# Patient Record
Sex: FEMALE | Race: WHITE | NOT HISPANIC OR LATINO | Employment: UNEMPLOYED | URBAN - METROPOLITAN AREA
[De-identification: names, ages, dates, MRNs, and addresses within clinical notes are randomized per-mention and may not be internally consistent; named-entity substitution may affect disease eponyms.]

---

## 2017-05-10 ENCOUNTER — HOSPITAL ENCOUNTER (OUTPATIENT)
Dept: RADIOLOGY | Facility: MEDICAL CENTER | Age: 42
End: 2017-05-10
Attending: OBSTETRICS & GYNECOLOGY
Payer: COMMERCIAL

## 2017-05-10 DIAGNOSIS — Z13.9 SCREENING: ICD-10-CM

## 2017-05-10 PROCEDURE — 77063 BREAST TOMOSYNTHESIS BI: CPT

## 2017-05-17 ENCOUNTER — OFFICE VISIT (OUTPATIENT)
Dept: MEDICAL GROUP | Facility: MEDICAL CENTER | Age: 42
End: 2017-05-17
Payer: COMMERCIAL

## 2017-05-17 VITALS
BODY MASS INDEX: 21.5 KG/M2 | SYSTOLIC BLOOD PRESSURE: 112 MMHG | OXYGEN SATURATION: 95 % | TEMPERATURE: 99.7 F | DIASTOLIC BLOOD PRESSURE: 68 MMHG | HEIGHT: 62 IN | HEART RATE: 71 BPM | WEIGHT: 116.84 LBS

## 2017-05-17 DIAGNOSIS — Z00.00 HEALTH CARE MAINTENANCE: ICD-10-CM

## 2017-05-17 DIAGNOSIS — H92.02 EARACHE, LEFT: ICD-10-CM

## 2017-05-17 DIAGNOSIS — H65.01 RIGHT ACUTE SEROUS OTITIS MEDIA, RECURRENCE NOT SPECIFIED: ICD-10-CM

## 2017-05-17 PROCEDURE — 99214 OFFICE O/P EST MOD 30 MIN: CPT | Performed by: INTERNAL MEDICINE

## 2017-05-17 RX ORDER — AZITHROMYCIN 250 MG/1
TABLET, FILM COATED ORAL
Qty: 6 TAB | Refills: 0 | Status: SHIPPED | OUTPATIENT
Start: 2017-05-17 | End: 2017-06-29

## 2017-05-17 ASSESSMENT — PATIENT HEALTH QUESTIONNAIRE - PHQ9: CLINICAL INTERPRETATION OF PHQ2 SCORE: 0

## 2017-05-17 NOTE — MR AVS SNAPSHOT
"        Ayse Reyes   2017 1:40 PM   Office Visit   MRN: [id number]    Department:  [place] [address]   Dept Phone:  [phone]    Description:  Female : 1975   Provider:  Gregorio Powers M.D.           Reason for Visit     Other Ear ache       Allergies as of 2017     Allergen Noted Reactions    Peanuts [Peanut Oil] 2016       Mouth gets itchy      You were diagnosed with     Earache, left   [181865]       Right acute serous otitis media, recurrence not specified   [2707488]       Health care maintenance   [[id number]]         Vital Signs     Blood Pressure Pulse Temperature Height Weight Body Mass Index    112/68 mmHg 71 37.6 °C (99.7 °F) 1.575 m (5' 2\") 53 kg (116 lb 13.5 oz) 21.37 kg/m2    Oxygen Saturation Smoking Status                95% Never Smoker           Basic Information     Date Of Birth Sex Race Ethnicity Preferred Language    1975 Female White Non- English      Your appointments     2017  2:00 PM   Access New To You with Bobbi Moyer M.D.   [facility] (--)    [address]  [address]  [facility] [id number] [phone]              Problem List              ICD-10-CM Priority Class Noted - Resolved    Health care maintenance Z00.00   2016 - Present      Health Maintenance        Date Due Completion Dates    PAP SMEAR 3/19/1997 ---    IMM DTaP/Tdap/Td Vaccine (1 - Tdap) 2002    MAMMOGRAM 5/10/2018 5/10/2017, 5/10/2017 (Done), 2016    Override on 5/10/2017: Done            Current Immunizations     Hepatitis B Vaccine Recombivax (Adol/Adult) 2001    MMR Vaccine 1992, 1977    OPV - Historical Data 1982, 1978, 1975, 1975, 1975    TD Vaccine 2002      Below and/or attached are the medications your provider expects you to take. Review all of your home medications and newly ordered medications with your provider and/or pharmacist. Follow medication instructions as " directed by your provider and/or pharmacist. Please keep your medication list with you and share with your provider. Update the information when medications are discontinued, doses are changed, or new medications (including over-the-counter products) are added; and carry medication information at all times in the event of emergency situations     Allergies:  PEANUTS - (reactions not documented)               Medications  Valid as of: May 17, 2017 -  2:34 PM    Generic Name Brand Name Tablet Size Instructions for use    Azithromycin (Tab) ZITHROMAX 250 MG Take 1 tabl twice daily the first day, then 1 tabl daily, PO.        Chlorpheniramine-Phenylephrine   Take  by mouth.        GuaiFENesin (TABLET SR 12 HR) MUCINEX 600 MG Take 600 mg by mouth every 12 hours.        Ibuprofen   Take  by mouth.        .                 Medicines prescribed today were sent to:     [id number] - [place] - [address] [place] [address]    Phone: [phone] Fax: [phone]    Open 24 Hours?: No      Medication refill instructions:       If your prescription bottle indicates you have medication refills left, it is not necessary to call your provider’s office. Please contact your pharmacy and they will refill your medication.    If your prescription bottle indicates you do not have any refills left, you may request refills at any time through one of the following ways: The online [employer] system (except Urgent Care), by calling your provider’s office, or by asking your pharmacy to contact your provider’s office with a refill request. Medication refills are processed only during regular business hours and may not be available until the next business day. Your provider may request additional information or to have a follow-up visit with you prior to refilling your medication.   *Please Note: Medication refills are assigned a new Rx number when refilled electronically. Your pharmacy may indicate that no refills were  authorized even though a new prescription for the same medication is available at the pharmacy. Please request the medicine by name with the pharmacy before contacting your provider for a refill.           [employer] Access Code: Activation code not generated  Current [employer] Status: Active

## 2017-05-17 NOTE — PROGRESS NOTES
CHIEF COMPLAINT  Chief Complaint   Patient presents with   • Other     Ear ache      HPI  Patient is a 42 y.o. female patient who presents today for the following     Left earache  The patient got sick ~ 1 month ago.   C/o left earache, that is:   Constant  Up/down, worsening  Accompanied with chills yesterday.     Denies:   Nasal congestion, nasal discharge, postnasal drip, pain in sinus areas  Fever, body aches, HA  Sore throat, swollen glands, difficulty swallowing  Cough, wheezing, chest pain  Decreased appetite, nausea, vomiting, diarrhea, abdominal pain  Skin rash.  Meds used: sudafed, slightly helped    Sick contact: neg  Flu vaccine: no    She has h/o otitis, mostly in the right side, the last was one year ago.           Reviewed PMH, PSH, FH, SH, ALL, HCM/IMM, no changes  Reviewed MEDS, no changes    Patient Active Problem List    Diagnosis Date Noted   • Health care maintenance 04/28/2016     CURRENT MEDICATIONS  Current Outpatient Prescriptions   Medication Sig Dispense Refill   • azithromycin (ZITHROMAX) 250 MG Tab Take 1 tabl twice daily the first day, then 1 tabl daily, PO. 6 Tab 0   • guaifenesin LA (MUCINEX) 600 MG TABLET SR 12 HR Take 600 mg by mouth every 12 hours.     • Chlorpheniramine-Phenylephrine (SUDAFED PE SINUS/ALLERGY PO) Take  by mouth.     • Ibuprofen (ADVIL PO) Take  by mouth.       No current facility-administered medications for this visit.     ALLERGIES  Allergies: Peanuts  PAST MEDICAL HISTORY  Past Medical History   Diagnosis Date   • Healthy adult      SURGICAL HISTORY  She  has past surgical history that includes breast implant revision.  SOCIAL HISTORY  Social History   Substance Use Topics   • Smoking status: Never Smoker    • Smokeless tobacco: Never Used   • Alcohol Use: 0.0 oz/week     0 Standard drinks or equivalent per week     Social History     Social History Narrative     FAMILY HISTORY  Family History   Problem Relation Age of Onset   • Diabetes Paternal Uncle    •  "Heart Attack Maternal Grandfather    • Stroke Paternal Grandfather    • Alzheimer's Disease Paternal Grandfather    • Cancer Paternal Grandfather    • Cancer Father      skin   • Hypertension Neg Hx    • Heart Disease Maternal Grandfather    • Hyperlipidemia Paternal Uncle    • Thyroid Neg Hx      Family Status   Relation Status Death Age   • Mother Alive    • Father Alive       ROS   Constitutional: As above.  HENT: Negative for congestion, sore throat. And per HPI.  Eyes: Negative for blurred vision.   Respiratory: Negative for cough, shortness of breath.  Cardiovascular: Negative for chest pain, palpitations.   Gastrointestinal: Negative for heartburn, nausea, abdominal pain.   Musculoskeletal: Negative for significant myalgias, back pain and joint pain.   Skin: Negative for rash and itching.   Neuro: Negative for dizziness, weakness and headaches.   Endo/Heme/Allergies: Does not bruise/bleed easily.   Psychiatric/Behavioral: Negative for depression.    PHYSICAL EXAM   Blood pressure 112/68, pulse 71, temperature 37.6 °C (99.7 °F), height 1.575 m (5' 2\"), weight 53 kg (116 lb 13.5 oz), SpO2 95 %. Body mass index is 21.37 kg/(m^2).  General:  NAD, well appearing  HEENT:   NC/AT, PERRLA, EOMI, TM on the left is bulging and erythematous, on the right side is not clear. Pharyngeal mucosa is erythematous,  without lesions;  no cervical / supraclavicular  lymphadenopathy, no thyromegaly.    Cardiovascular: RRR.   No m/r/g. No carotid bruits .      Lungs:   CTAB, no w/r/r, no respiratory distress.  Abdomen: Soft, NT/ND + BS; no suprapubic tenderness; no masses or hepatosplenomegaly.  Extremities:  2+ DP and radial pulses bilaterally.  No c/c/e.   Skin:  Warm, dry.  No erythema. No rash.   Neurologic: Alert & oriented x 3.   No focal deficits.  Psychiatric:  Affect normal, mood normal, judgment normal.    LABS     None.    IMAGING     None    ASSESMENT AND PLAN        1. Earache, left  - azithromycin (ZITHROMAX) 250 MG " Tab; Take 1 tabl twice daily the first day, then 1 tabl daily, PO.  Dispense: 6 Tab; Refill: 0    Take abx after a meal.   Side effects of abx use discussed with a patient. Advised to stop abx and call if develop any side effect, including diarrhea.     Advised   - increase fluid intake;   - may take Tylenol/ibuprofen for fever, pain; nasal decongestant    2. Right acute serous otitis media, recurrence not specified  As above.  - azithromycin (ZITHROMAX) 250 MG Tab; Take 1 tabl twice daily the first day, then 1 tabl daily, PO.  Dispense: 6 Tab; Refill: 0    3. Health care maintenance  Pending PAP in 6/17.    Counseling:   - Smoking:  Nonsmoker    Followup: Return if symptoms worsen or fail to improve.    All questions are answered.    Please note that this dictation was created using voice recognition software, and that there might be errors of valdo and possibly content.

## 2017-06-28 ENCOUNTER — TELEPHONE (OUTPATIENT)
Dept: MEDICAL GROUP | Facility: MEDICAL CENTER | Age: 42
End: 2017-06-28

## 2017-06-28 NOTE — TELEPHONE ENCOUNTER
ESTABLISHED PATIENT PRE-VISIT PLANNING     Note: Patient will not be contacted if there is no indication to call.     1.  Reviewed notes from the last few office visits within the medical group: Yes    2.  If any orders were placed at last visit or intended to be done for this visit (i.e. 6 mos follow-up), do we have Results/Consult Notes?        •  Labs - Labs were not ordered at last office visit.       •  Imaging - Imaging ordered, completed and results are in chart.       •  Referrals - No referrals were ordered at last office visit.    3. Is this appointment scheduled as a Hospital Follow-Up? No    4.  Immunizations were updated in [facility] using [employer]?: Yes       •  Web [employer] Recommendations: FLU HEPATITIS B    5.  Patient is due for the following Health Maintenance Topics:   Health Maintenance Due   Topic Date Due   • PAP SMEAR  03/19/1997           6.  Patient was NOT informed to arrive 15 min prior to their scheduled appointment and bring in their medication bottles.

## 2017-06-29 ENCOUNTER — OFFICE VISIT (OUTPATIENT)
Dept: MEDICAL GROUP | Facility: MEDICAL CENTER | Age: 42
End: 2017-06-29
Payer: COMMERCIAL

## 2017-06-29 VITALS
HEART RATE: 72 BPM | BODY MASS INDEX: 21.35 KG/M2 | HEIGHT: 62 IN | WEIGHT: 116 LBS | TEMPERATURE: 98.9 F | SYSTOLIC BLOOD PRESSURE: 102 MMHG | DIASTOLIC BLOOD PRESSURE: 64 MMHG | OXYGEN SATURATION: 98 %

## 2017-06-29 DIAGNOSIS — R10.10 PAIN OF UPPER ABDOMEN: ICD-10-CM

## 2017-06-29 DIAGNOSIS — R22.1 LUMP IN NECK: ICD-10-CM

## 2017-06-29 DIAGNOSIS — K58.9 IRRITABLE BOWEL SYNDROME, UNSPECIFIED TYPE: ICD-10-CM

## 2017-06-29 DIAGNOSIS — Z00.00 HEALTH CARE MAINTENANCE: ICD-10-CM

## 2017-06-29 PROCEDURE — 99214 OFFICE O/P EST MOD 30 MIN: CPT | Performed by: INTERNAL MEDICINE

## 2017-06-29 NOTE — PROGRESS NOTES
CHIEF COMPLAINT  Chief Complaint   Patient presents with   • Other     bump on neck     HPI  Patient is a 42 y.o. female patient who presents today for the following     Neck lump  Onset: 2 yrs ago  Location: Medial aspect of sternocleidomastoid muscle insertion to clavicular bone on the right side.  Course: Increased in size for the last 1-2 months.     Denies:  Fever, chills, anorexia, weight loss, fatigue, sweating  Swollen glands  Temperature intolerance. No change in hair/skin quality, BMs.     IBS, abdominal pain  The patient has history of IBS, diarrhea/constipation type.   Complains of frequent abdominal pain with alternating diarrhea and constipation.   She requested abdominal ultrasound.    Reviewed PMH, PSH, FH, SH, ALL, HCM/IMM, no changes  Reviewed MEDS, updated    Patient Active Problem List    Diagnosis Date Noted   • Lump in neck 06/29/2017   • Health care maintenance 04/28/2016     CURRENT MEDICATIONS  Current Outpatient Prescriptions   Medication Sig Dispense Refill   • azithromycin (ZITHROMAX) 250 MG Tab Take 1 tabl twice daily the first day, then 1 tabl daily, PO. 6 Tab 0   • guaifenesin LA (MUCINEX) 600 MG TABLET SR 12 HR Take 600 mg by mouth every 12 hours.     • Chlorpheniramine-Phenylephrine (SUDAFED PE SINUS/ALLERGY PO) Take  by mouth.     • Ibuprofen (ADVIL PO) Take  by mouth.       No current facility-administered medications for this visit.     ALLERGIES  Allergies: Peanuts    PAST MEDICAL HISTORY  Past Medical History   Diagnosis Date   • Fibroids    • IBS (irritable bowel syndrome)      SURGICAL HISTORY  She  has past surgical history that includes breast implant revision.    SOCIAL HISTORY  Social History   Substance Use Topics   • Smoking status: Never Smoker    • Smokeless tobacco: Never Used   • Alcohol Use: 0.0 oz/week     0 Standard drinks or equivalent per week     Social History     Social History Narrative     FAMILY HISTORY  Family History   Problem Relation Age of Onset   •  "Diabetes Paternal Uncle    • Heart Attack Maternal Grandfather    • Stroke Paternal Grandfather    • Alzheimer's Disease Paternal Grandfather    • Cancer Paternal Grandfather    • Cancer Father      skin   • Hypertension Neg Hx    • Heart Disease Maternal Grandfather    • Hyperlipidemia Paternal Uncle    • Thyroid Neg Hx      Family Status   Relation Status Death Age   • Mother Alive    • Father Alive      ROS   Constitutional: Negative for fever, chills.  HENT: Negative for congestion, sore throat. And per HPI.  Eyes: Negative for blurred vision.   Respiratory: Negative for cough, shortness of breath.  Cardiovascular: Negative for chest pain, palpitations.   Gastrointestinal: Negative for heartburn, nausea, and as above.  Genitourinary: Negative for dysuria.  Musculoskeletal: Negative for significant myalgias, back pain and joint pain.   Skin: Negative for rash and itching.   Neuro: Negative for dizziness, weakness and headaches.   Endo/Heme/Allergies: Does not bruise/bleed easily.   Psychiatric/Behavioral: Negative for depression, anxiety    PHYSICAL EXAM   Blood pressure 102/64, pulse 72, temperature 37.2 °C (98.9 °F), height 1.575 m (5' 2\"), weight 52.617 kg (116 lb), SpO2 98 %. Body mass index is 21.21 kg/(m^2).  General:  NAD, well appearing  HEENT:   NC/AT, PERRLA, EOMI, TMs are clear. Oropharyngeal mucosa is pink,  without lesions;  no cervical / supraclavicular  lymphadenopathy, no thyromegaly.    Small lump, ~ 1 cm, on the medial aspect of the R sternocleidomastoid muscle insertion to clavicle; nontender, firm, movable, w/o redness.    Cardiovascular: RRR.   No m/r/g. No carotid bruits .      Lungs:   CTAB, no w/r/r, no respiratory distress.  Abdomen: Soft, NT/ND + BS; no suprapubic tenderness; no masses or hepatosplenomegaly.  Extremities:  2+ DP and radial pulses bilaterally.  No c/c/e.   Skin:  Warm, dry.  No erythema. No rash.   Neurologic: Alert & oriented x 3.  No focal deficits.  Psychiatric:  " Affect normal, mood normal, judgment normal.    LABS     Pending.    IMAGING     Pending.    ASSESMENT AND PLAN        1. Lump in neck  Pending imaging and labs   - US-SOFT TISSUES OF HEAD - NECK; Future  - TSH; Future    2. Irritable bowel syndrome, unspecified type  Continue current lifestyle regimen    3. Pain of upper abdomen  - US-ABDOMEN COMPLETE SURVEY; Future  - CBC WITH DIFFERENTIAL; Future  - COMP METABOLIC PANEL; Future    4. Health care maintenance  - LIPID PROFILE; Future  - VITAMIN D,25 HYDROXY; Future    Counseling:   - Smoking:  Nonsmoker    Followup: Return in about 3 months (around 9/29/2017) for Short.    All questions are answered.    Please note that this dictation was created using voice recognition software, and that there might be errors of valdo and possibly content.

## 2017-06-29 NOTE — Clinical Note
[facility]  No primary care provider on file.  No primary provider on file.  Fax: [phone]   Authorization for Release/Disclosure of   Protected Health Information   Name: JASKARAN REYES : 1975 SSN: [id number]   Address: [address] [place], [place] [address] [address] Phone:    [phone] (home) [phone] (work)   I authorize the entity listed below to release/disclose the PHI below to:   [facility]/No primary care provider on file. and Gregorio Powers M.D.   Provider or Entity Name:     Address   City, State, [facility]   Phone:      Fax:     Reason for request: continuity of care   Information to be released:    [  ] LAST COLONOSCOPY,  including any PATH REPORT and follow-up  [  ] LAST FIT/COLOGUARD RESULT [  ] LAST DEXA  [  ] LAST MAMMOGRAM  [ XXX ] LAST PAP  [  ] LAST LABS [  ] RETINA EXAM REPORT  [  ] IMMUNIZATION RECORDS  [  ] Release all info      [  ] Check here and initial the line next to each item to release ALL health information INCLUDING  _____ Care and treatment for drug and / or alcohol abuse  _____ HIV testing, infection status, or AIDS  _____ Genetic Testing    DATES OF SERVICE OR TIME PERIOD TO BE DISCLOSED: _____________  I understand and acknowledge that:  * This Authorization may be revoked at any time by you in writing, except if your health information has already been used or disclosed.  * Your health information that will be used or disclosed as a result of you signing this authorization could be re-disclosed by the recipient. If this occurs, your re-disclosed health information may no longer be protected by State or Federal laws.  * You may refuse to sign this Authorization. Your refusal will not affect your ability to obtain treatment.  * This Authorization becomes effective upon signing and will  on (date) __________.      If no date is indicated, this Authorization will  one (1) year from the signature date.    Name: Jaskaran Reyes    Signature:   Date:          6/29/2017       PLEASE FAX REQUESTED RECORDS BACK TO: [phone]

## 2017-07-01 ENCOUNTER — HOSPITAL ENCOUNTER (OUTPATIENT)
Dept: LAB | Facility: MEDICAL CENTER | Age: 42
End: 2017-07-01
Attending: INTERNAL MEDICINE
Payer: COMMERCIAL

## 2017-07-01 DIAGNOSIS — Z00.00 HEALTH CARE MAINTENANCE: ICD-10-CM

## 2017-07-01 DIAGNOSIS — R22.1 LUMP IN NECK: ICD-10-CM

## 2017-07-01 DIAGNOSIS — R10.10 PAIN OF UPPER ABDOMEN: ICD-10-CM

## 2017-07-01 LAB
25(OH)D3 SERPL-MCNC: 41 NG/ML (ref 30–100)
ALBUMIN SERPL BCP-MCNC: 4.4 G/DL (ref 3.2–4.9)
ALBUMIN/GLOB SERPL: 1.4 G/DL
ALP SERPL-CCNC: 48 U/L (ref 30–99)
ALT SERPL-CCNC: 18 U/L (ref 2–50)
ANION GAP SERPL CALC-SCNC: 10 MMOL/L (ref 0–11.9)
AST SERPL-CCNC: 19 U/L (ref 12–45)
BASOPHILS # BLD AUTO: 1.1 % (ref 0–1.8)
BASOPHILS # BLD: 0.07 K/UL (ref 0–0.12)
BILIRUB SERPL-MCNC: 0.7 MG/DL (ref 0.1–1.5)
BUN SERPL-MCNC: 10 MG/DL (ref 8–22)
CALCIUM SERPL-MCNC: 9.4 MG/DL (ref 8.5–10.5)
CHLORIDE SERPL-SCNC: 107 MMOL/L (ref 96–112)
CHOLEST SERPL-MCNC: 159 MG/DL (ref 100–199)
CO2 SERPL-SCNC: 24 MMOL/L (ref 20–33)
CREAT SERPL-MCNC: 0.79 MG/DL (ref 0.5–1.4)
EOSINOPHIL # BLD AUTO: 0.15 K/UL (ref 0–0.51)
EOSINOPHIL NFR BLD: 2.4 % (ref 0–6.9)
ERYTHROCYTE [DISTWIDTH] IN BLOOD BY AUTOMATED COUNT: 41.1 FL (ref 35.9–50)
GFR SERPL CREATININE-BSD FRML MDRD: >60 ML/MIN/1.73 M 2
GLOBULIN SER CALC-MCNC: 3.1 G/DL (ref 1.9–3.5)
GLUCOSE SERPL-MCNC: 80 MG/DL (ref 65–99)
HCT VFR BLD AUTO: 39.8 % (ref 37–47)
HDLC SERPL-MCNC: 58 MG/DL
HGB BLD-MCNC: 13.2 G/DL (ref 12–16)
IMM GRANULOCYTES # BLD AUTO: 0.02 K/UL (ref 0–0.11)
IMM GRANULOCYTES NFR BLD AUTO: 0.3 % (ref 0–0.9)
LDLC SERPL CALC-MCNC: 88 MG/DL
LYMPHOCYTES # BLD AUTO: 1.18 K/UL (ref 1–4.8)
LYMPHOCYTES NFR BLD: 19.2 % (ref 22–41)
MCH RBC QN AUTO: 31.4 PG (ref 27–33)
MCHC RBC AUTO-ENTMCNC: 33.2 G/DL (ref 33.6–35)
MCV RBC AUTO: 94.8 FL (ref 81.4–97.8)
MONOCYTES # BLD AUTO: 0.4 K/UL (ref 0–0.85)
MONOCYTES NFR BLD AUTO: 6.5 % (ref 0–13.4)
NEUTROPHILS # BLD AUTO: 4.32 K/UL (ref 2–7.15)
NEUTROPHILS NFR BLD: 70.5 % (ref 44–72)
NRBC # BLD AUTO: 0 K/UL
NRBC BLD AUTO-RTO: 0 /100 WBC
PLATELET # BLD AUTO: 278 K/UL (ref 164–446)
PMV BLD AUTO: 10.4 FL (ref 9–12.9)
POTASSIUM SERPL-SCNC: 3.7 MMOL/L (ref 3.6–5.5)
PROT SERPL-MCNC: 7.5 G/DL (ref 6–8.2)
RBC # BLD AUTO: 4.2 M/UL (ref 4.2–5.4)
SODIUM SERPL-SCNC: 141 MMOL/L (ref 135–145)
TRIGL SERPL-MCNC: 67 MG/DL (ref 0–149)
TSH SERPL DL<=0.005 MIU/L-ACNC: 2.31 UIU/ML (ref 0.3–3.7)
WBC # BLD AUTO: 6.1 K/UL (ref 4.8–10.8)

## 2017-07-01 PROCEDURE — 82306 VITAMIN D 25 HYDROXY: CPT

## 2017-07-01 PROCEDURE — 84443 ASSAY THYROID STIM HORMONE: CPT

## 2017-07-01 PROCEDURE — 85025 COMPLETE CBC W/AUTO DIFF WBC: CPT

## 2017-07-01 PROCEDURE — 80053 COMPREHEN METABOLIC PANEL: CPT

## 2017-07-01 PROCEDURE — 80061 LIPID PANEL: CPT

## 2017-07-01 PROCEDURE — 36415 COLL VENOUS BLD VENIPUNCTURE: CPT

## 2017-09-09 ENCOUNTER — HOSPITAL ENCOUNTER (OUTPATIENT)
Dept: RADIOLOGY | Facility: MEDICAL CENTER | Age: 42
End: 2017-09-09
Attending: INTERNAL MEDICINE
Payer: COMMERCIAL

## 2017-09-09 DIAGNOSIS — R22.1 LUMP IN NECK: ICD-10-CM

## 2017-09-09 DIAGNOSIS — R10.10 PAIN OF UPPER ABDOMEN: ICD-10-CM

## 2017-09-09 PROCEDURE — 76536 US EXAM OF HEAD AND NECK: CPT

## 2017-09-09 PROCEDURE — 76700 US EXAM ABDOM COMPLETE: CPT

## 2018-01-09 DIAGNOSIS — Z01.812 PRE-OPERATIVE LABORATORY EXAMINATION: ICD-10-CM

## 2018-01-09 LAB
ANION GAP SERPL CALC-SCNC: 7 MMOL/L (ref 0–11.9)
APPEARANCE UR: CLEAR
BACTERIA #/AREA URNS HPF: NEGATIVE /HPF
BASOPHILS # BLD AUTO: 0.5 % (ref 0–1.8)
BASOPHILS # BLD: 0.04 K/UL (ref 0–0.12)
BILIRUB UR QL STRIP.AUTO: NEGATIVE
BUN SERPL-MCNC: 11 MG/DL (ref 8–22)
CALCIUM SERPL-MCNC: 9.4 MG/DL (ref 8.5–10.5)
CHLORIDE SERPL-SCNC: 109 MMOL/L (ref 96–112)
CO2 SERPL-SCNC: 24 MMOL/L (ref 20–33)
COLOR UR: YELLOW
CREAT SERPL-MCNC: 0.79 MG/DL (ref 0.5–1.4)
CULTURE IF INDICATED INDCX: YES UA CULTURE
EOSINOPHIL # BLD AUTO: 0.06 K/UL (ref 0–0.51)
EOSINOPHIL NFR BLD: 0.7 % (ref 0–6.9)
EPI CELLS #/AREA URNS HPF: NEGATIVE /HPF
ERYTHROCYTE [DISTWIDTH] IN BLOOD BY AUTOMATED COUNT: 40.6 FL (ref 35.9–50)
GLUCOSE SERPL-MCNC: 91 MG/DL (ref 65–99)
GLUCOSE UR STRIP.AUTO-MCNC: NEGATIVE MG/DL
HCG SERPL QL: NEGATIVE
HCT VFR BLD AUTO: 38.1 % (ref 37–47)
HGB BLD-MCNC: 13.1 G/DL (ref 12–16)
HYALINE CASTS #/AREA URNS LPF: NORMAL /LPF
IMM GRANULOCYTES # BLD AUTO: 0.02 K/UL (ref 0–0.11)
IMM GRANULOCYTES NFR BLD AUTO: 0.2 % (ref 0–0.9)
KETONES UR STRIP.AUTO-MCNC: NEGATIVE MG/DL
LEUKOCYTE ESTERASE UR QL STRIP.AUTO: ABNORMAL
LYMPHOCYTES # BLD AUTO: 1.38 K/UL (ref 1–4.8)
LYMPHOCYTES NFR BLD: 16 % (ref 22–41)
MCH RBC QN AUTO: 32.1 PG (ref 27–33)
MCHC RBC AUTO-ENTMCNC: 34.4 G/DL (ref 33.6–35)
MCV RBC AUTO: 93.4 FL (ref 81.4–97.8)
MICRO URNS: ABNORMAL
MONOCYTES # BLD AUTO: 0.45 K/UL (ref 0–0.85)
MONOCYTES NFR BLD AUTO: 5.2 % (ref 0–13.4)
NEUTROPHILS # BLD AUTO: 6.68 K/UL (ref 2–7.15)
NEUTROPHILS NFR BLD: 77.4 % (ref 44–72)
NITRITE UR QL STRIP.AUTO: NEGATIVE
NRBC # BLD AUTO: 0 K/UL
NRBC BLD-RTO: 0 /100 WBC
PH UR STRIP.AUTO: 6.5 [PH]
PLATELET # BLD AUTO: 211 K/UL (ref 164–446)
PMV BLD AUTO: 10.7 FL (ref 9–12.9)
POTASSIUM SERPL-SCNC: 3.5 MMOL/L (ref 3.6–5.5)
PROT UR QL STRIP: NEGATIVE MG/DL
RBC # BLD AUTO: 4.08 M/UL (ref 4.2–5.4)
RBC # URNS HPF: NORMAL /HPF
RBC UR QL AUTO: NEGATIVE
SODIUM SERPL-SCNC: 140 MMOL/L (ref 135–145)
SP GR UR STRIP.AUTO: 1.01
UROBILINOGEN UR STRIP.AUTO-MCNC: 0.2 MG/DL
WBC # BLD AUTO: 8.6 K/UL (ref 4.8–10.8)
WBC #/AREA URNS HPF: NORMAL /HPF

## 2018-01-09 PROCEDURE — 36415 COLL VENOUS BLD VENIPUNCTURE: CPT

## 2018-01-09 PROCEDURE — 85025 COMPLETE CBC W/AUTO DIFF WBC: CPT

## 2018-01-09 PROCEDURE — 87086 URINE CULTURE/COLONY COUNT: CPT

## 2018-01-09 PROCEDURE — 81001 URINALYSIS AUTO W/SCOPE: CPT

## 2018-01-09 PROCEDURE — 80048 BASIC METABOLIC PNL TOTAL CA: CPT

## 2018-01-09 PROCEDURE — 84703 CHORIONIC GONADOTROPIN ASSAY: CPT

## 2018-01-09 RX ORDER — JUNIPER 300 MG
CAPSULE ORAL
COMMUNITY
End: 2020-03-13

## 2018-01-09 RX ORDER — ASCORBIC ACID 500 MG
500 TABLET ORAL DAILY
COMMUNITY
End: 2020-03-13

## 2018-01-09 RX ORDER — ACETAMINOPHEN 325 MG/1
650 TABLET ORAL EVERY 4 HOURS PRN
COMMUNITY
End: 2020-03-13

## 2018-01-09 RX ORDER — CETIRIZINE HYDROCHLORIDE 10 MG/1
10 TABLET ORAL EVERY EVENING
COMMUNITY

## 2018-01-11 LAB
BACTERIA UR CULT: NORMAL
SIGNIFICANT IND 70042: NORMAL
SITE SITE: NORMAL
SOURCE SOURCE: NORMAL

## 2018-01-12 ENCOUNTER — HOSPITAL ENCOUNTER (OUTPATIENT)
Facility: MEDICAL CENTER | Age: 43
End: 2018-01-12
Attending: OBSTETRICS & GYNECOLOGY | Admitting: OBSTETRICS & GYNECOLOGY
Payer: COMMERCIAL

## 2018-01-12 VITALS
HEART RATE: 49 BPM | WEIGHT: 114.64 LBS | SYSTOLIC BLOOD PRESSURE: 82 MMHG | OXYGEN SATURATION: 96 % | HEIGHT: 62 IN | TEMPERATURE: 97.9 F | RESPIRATION RATE: 16 BRPM | BODY MASS INDEX: 21.1 KG/M2 | DIASTOLIC BLOOD PRESSURE: 51 MMHG

## 2018-01-12 DIAGNOSIS — R10.2 FEMALE PELVIC PAIN: Primary | ICD-10-CM

## 2018-01-12 DIAGNOSIS — Z00.00 HEALTH CARE MAINTENANCE: ICD-10-CM

## 2018-01-12 LAB
B-HCG FREE SERPL-ACNC: <5 MIU/ML
ERYTHROCYTE [DISTWIDTH] IN BLOOD BY AUTOMATED COUNT: 40.8 FL (ref 35.9–50)
HCT VFR BLD AUTO: 35.6 % (ref 37–47)
HGB BLD-MCNC: 12.1 G/DL (ref 12–16)
IHCGL IHCGL: NEGATIVE MIU/ML
MCH RBC QN AUTO: 32.1 PG (ref 27–33)
MCHC RBC AUTO-ENTMCNC: 34 G/DL (ref 33.6–35)
MCV RBC AUTO: 94.4 FL (ref 81.4–97.8)
PLATELET # BLD AUTO: 183 K/UL (ref 164–446)
PMV BLD AUTO: 10.4 FL (ref 9–12.9)
RBC # BLD AUTO: 3.77 M/UL (ref 4.2–5.4)
WBC # BLD AUTO: 18 K/UL (ref 4.8–10.8)

## 2018-01-12 PROCEDURE — 160002 HCHG RECOVERY MINUTES (STAT): Performed by: OBSTETRICS & GYNECOLOGY

## 2018-01-12 PROCEDURE — 85027 COMPLETE CBC AUTOMATED: CPT

## 2018-01-12 PROCEDURE — 501838 HCHG SUTURE GENERAL: Performed by: OBSTETRICS & GYNECOLOGY

## 2018-01-12 PROCEDURE — 700104 HCHG RX REV CODE 254

## 2018-01-12 PROCEDURE — 700111 HCHG RX REV CODE 636 W/ 250 OVERRIDE (IP)

## 2018-01-12 PROCEDURE — 160048 HCHG OR STATISTICAL LEVEL 1-5: Performed by: OBSTETRICS & GYNECOLOGY

## 2018-01-12 PROCEDURE — 160029 HCHG SURGERY MINUTES - 1ST 30 MINS LEVEL 4: Performed by: OBSTETRICS & GYNECOLOGY

## 2018-01-12 PROCEDURE — 160009 HCHG ANES TIME/MIN: Performed by: OBSTETRICS & GYNECOLOGY

## 2018-01-12 PROCEDURE — 501330 HCHG SET, CYSTO IRRIG TUBING: Performed by: OBSTETRICS & GYNECOLOGY

## 2018-01-12 PROCEDURE — 700102 HCHG RX REV CODE 250 W/ 637 OVERRIDE(OP)

## 2018-01-12 PROCEDURE — 700101 HCHG RX REV CODE 250

## 2018-01-12 PROCEDURE — 501411 HCHG SPONGE, BABY LAP W/O RINGS: Performed by: OBSTETRICS & GYNECOLOGY

## 2018-01-12 PROCEDURE — 88307 TISSUE EXAM BY PATHOLOGIST: CPT

## 2018-01-12 PROCEDURE — 160041 HCHG SURGERY MINUTES - EA ADDL 1 MIN LEVEL 4: Performed by: OBSTETRICS & GYNECOLOGY

## 2018-01-12 PROCEDURE — 36415 COLL VENOUS BLD VENIPUNCTURE: CPT

## 2018-01-12 PROCEDURE — 160035 HCHG PACU - 1ST 60 MINS PHASE I: Performed by: OBSTETRICS & GYNECOLOGY

## 2018-01-12 PROCEDURE — 84702 CHORIONIC GONADOTROPIN TEST: CPT

## 2018-01-12 PROCEDURE — 160036 HCHG PACU - EA ADDL 30 MINS PHASE I: Performed by: OBSTETRICS & GYNECOLOGY

## 2018-01-12 PROCEDURE — A9270 NON-COVERED ITEM OR SERVICE: HCPCS

## 2018-01-12 PROCEDURE — 500892 HCHG PACK, PERI-GYN: Performed by: OBSTETRICS & GYNECOLOGY

## 2018-01-12 RX ORDER — OXYCODONE HCL 5 MG/5 ML
SOLUTION, ORAL ORAL
Status: COMPLETED
Start: 2018-01-12 | End: 2018-01-12

## 2018-01-12 RX ORDER — SODIUM CHLORIDE, SODIUM LACTATE, POTASSIUM CHLORIDE, CALCIUM CHLORIDE 600; 310; 30; 20 MG/100ML; MG/100ML; MG/100ML; MG/100ML
INJECTION, SOLUTION INTRAVENOUS CONTINUOUS
Status: DISCONTINUED | OUTPATIENT
Start: 2018-01-12 | End: 2018-01-12 | Stop reason: HOSPADM

## 2018-01-12 RX ORDER — LIDOCAINE HYDROCHLORIDE AND EPINEPHRINE 10; 10 MG/ML; UG/ML
INJECTION, SOLUTION INFILTRATION; PERINEURAL
Status: DISCONTINUED | OUTPATIENT
Start: 2018-01-12 | End: 2018-01-12 | Stop reason: HOSPADM

## 2018-01-12 RX ORDER — EPINEPHRINE 1 MG/ML
INJECTION INTRAMUSCULAR; INTRAVENOUS; SUBCUTANEOUS
Status: DISCONTINUED
Start: 2018-01-12 | End: 2018-01-12 | Stop reason: HOSPADM

## 2018-01-12 RX ORDER — OXYCODONE AND ACETAMINOPHEN 7.5; 325 MG/1; MG/1
1 TABLET ORAL EVERY 4 HOURS PRN
Qty: 28 TAB | Refills: 0 | Status: SHIPPED | OUTPATIENT
Start: 2018-01-12 | End: 2018-01-19

## 2018-01-12 RX ORDER — LIDOCAINE HYDROCHLORIDE 10 MG/ML
0.5 INJECTION, SOLUTION INFILTRATION; PERINEURAL
Status: DISCONTINUED | OUTPATIENT
Start: 2018-01-12 | End: 2018-01-12 | Stop reason: HOSPADM

## 2018-01-12 RX ORDER — OXYCODONE HYDROCHLORIDE 5 MG/1
5 TABLET ORAL
Status: DISCONTINUED | OUTPATIENT
Start: 2018-01-12 | End: 2018-01-12 | Stop reason: HOSPADM

## 2018-01-12 RX ORDER — ONDANSETRON 2 MG/ML
4 INJECTION INTRAMUSCULAR; INTRAVENOUS EVERY 6 HOURS PRN
Status: DISCONTINUED | OUTPATIENT
Start: 2018-01-12 | End: 2018-01-12 | Stop reason: HOSPADM

## 2018-01-12 RX ORDER — LIDOCAINE HYDROCHLORIDE 10 MG/ML
INJECTION, SOLUTION EPIDURAL; INFILTRATION; INTRACAUDAL; PERINEURAL
Status: DISCONTINUED
Start: 2018-01-12 | End: 2018-01-12 | Stop reason: HOSPADM

## 2018-01-12 RX ORDER — LIDOCAINE AND PRILOCAINE 25; 25 MG/G; MG/G
1 CREAM TOPICAL
Status: DISCONTINUED | OUTPATIENT
Start: 2018-01-12 | End: 2018-01-12 | Stop reason: HOSPADM

## 2018-01-12 RX ORDER — LIDOCAINE HYDROCHLORIDE 10 MG/ML
INJECTION, SOLUTION INFILTRATION; PERINEURAL
Status: DISCONTINUED
Start: 2018-01-12 | End: 2018-01-12 | Stop reason: HOSPADM

## 2018-01-12 RX ADMIN — FENTANYL CITRATE 50 MCG: 50 INJECTION, SOLUTION INTRAMUSCULAR; INTRAVENOUS at 12:20

## 2018-01-12 RX ADMIN — SODIUM CHLORIDE, SODIUM LACTATE, POTASSIUM CHLORIDE, CALCIUM CHLORIDE 1000 ML: 600; 310; 30; 20 INJECTION, SOLUTION INTRAVENOUS at 09:30

## 2018-01-12 RX ADMIN — FENTANYL CITRATE 50 MCG: 50 INJECTION, SOLUTION INTRAMUSCULAR; INTRAVENOUS at 12:55

## 2018-01-12 RX ADMIN — OXYCODONE HYDROCHLORIDE 10 MG: 5 SOLUTION ORAL at 11:50

## 2018-01-12 ASSESSMENT — PAIN SCALES - GENERAL
PAINLEVEL_OUTOF10: 5
PAINLEVEL_OUTOF10: 7
PAINLEVEL_OUTOF10: 5
PAINLEVEL_OUTOF10: 7
PAINLEVEL_OUTOF10: 5
PAINLEVEL_OUTOF10: 7
PAINLEVEL_OUTOF10: 0
PAINLEVEL_OUTOF10: 5
PAINLEVEL_OUTOF10: 5

## 2018-01-12 NOTE — OR NURSING
1126 Pt received to PACU with report from Dr. Joseph.  Respirations even and unlabored with a chin lift applied. No airway in place with nasal cannula. Pt has no incisions noted on abd, abd is soft, no vaginal bleeding, peripad in place CDI.  1135 Pt more awake, able to maintain respirations without chin lift. Pt denies pain or nausea at this time.  1150 Pt having increase in pain, but states tolerable. Oral medications provided.  1220 Pt having increase in pain, cramping pain medication provided, see MAR.SO given prescription.   1255 Pt's pain remains 7/10, cramping, pain medication provided, see MAR.  back to bedside. Pt tolerating fluids. Ice pack applied to abd. Report given to Miriam TORRES for break.  1330 Back from break update provided. Pt was able to void. Pain is controlled 5/10 and no nausea. Pt dressed and in a recliner.  1345 Discharge instructions reviewed.  1400 Dr. Walker called with update. Ok to draw cbc now ok to Discharge after H/H results posted.  1500 H/H results posted. Slight decrease in H/H but ok to discharge home per Dr. Walker. Pt feels ready for discharge. Pt aware of signs and symptoms of bleeding and when to notify the physician. IV removed and patient taken out by wheelchair with RN and .

## 2018-01-12 NOTE — DISCHARGE INSTRUCTIONS
ACTIVITY: Rest and take it easy for the first 24 hours.  A responsible adult is recommended to remain with you during that time.  It is normal to feel sleepy.  We encourage you to not do anything that requires balance, judgment or coordination.    MILD FLU-LIKE SYMPTOMS ARE NORMAL. YOU MAY EXPERIENCE GENERALIZED MUSCLE ACHES, THROAT IRRITATION, HEADACHE AND/OR SOME NAUSEA.    FOR 24 HOURS DO NOT:  Drive, operate machinery or run household appliances.  Drink beer or alcoholic beverages.   Make important decisions or sign legal documents.    SPECIAL INSTRUCTIONS: **Follow Hysterectomy Handout*    DIET: To avoid nausea, slowly advance diet as tolerated, avoiding spicy or greasy foods for the first day.  Add more substantial food to your diet according to your physician's instructions.  Babies can be fed formula or breast milk as soon as they are hungry.  INCREASE FLUIDS AND FIBER TO AVOID CONSTIPATION.    SURGICAL DRESSING/BATHING: *No baths, swimming, or hot tubs until ok by Dr. Walker**    FOLLOW-UP APPOINTMENT:  A follow-up appointment should be arranged with your doctor 1/18/18, Thursday at 1300.     You should CALL YOUR PHYSICIAN if you develop:  Fever greater than 101 degrees F.  Pain not relieved by medication, or persistent nausea or vomiting.  Excessive bleeding (blood soaking through dressing) or unexpected drainage from the wound.  Extreme redness or swelling around the incision site, drainage of pus or foul smelling drainage.  Inability to urinate or empty your bladder within 8 hours.  Problems with breathing or chest pain.    You should call 911 if you develop problems with breathing or chest pain.  If you are unable to contact your doctor or surgical center, you should go to the nearest emergency room or urgent care center.  Physician's telephone #: **[phone]*    If any questions arise, call your doctor.  If your doctor is not available, please feel free to call the Surgical Center at  [phone].  The Center is open Monday through Friday from 7AM to 7PM.  You can also call the HEALTH HOTLINE open 24 hours/day, 7 days/week and speak to a nurse at [phone], or toll free at [phone].    A registered nurse may call you a few days after your surgery to see how you are doing after your procedure.    MEDICATIONS: Resume taking daily medication.  Take prescribed pain medication with food.  If no medication is prescribed, you may take non-aspirin pain medication if needed.  PAIN MEDICATION CAN BE VERY CONSTIPATING.  Take a stool softener or laxative such as senokot, pericolace, or milk of magnesia if needed.    Prescription given for **Percocet*.  Last pain medication given at *1150, can take next at 1550**.    If your physician has prescribed pain medication that includes Acetaminophen (Tylenol), do not take additional Acetaminophen (Tylenol) while taking the prescribed medication.    Depression / Suicide Risk    As you are discharged from this [facility] facility, it is important to learn how to keep safe from harming yourself.    Recognize the warning signs:  · Abrupt changes in personality, positive or negative- including increase in energy   · Giving away possessions  · Change in eating patterns- significant weight changes-  positive or negative  · Change in sleeping patterns- unable to sleep or sleeping all the time   · Unwillingness or inability to communicate  · Depression  · Unusual sadness, discouragement and loneliness  · Talk of wanting to die  · Neglect of personal appearance   · Rebelliousness- reckless behavior  · Withdrawal from people/activities they love  · Confusion- inability to concentrate     If you or a loved one observes any of these behaviors or has concerns about self-harm, here's what you can do:  · Talk about it- your feelings and reasons for harming yourself  · Remove any means that you might use to hurt yourself (examples: pills, rope, extension cords,  firearm)  · Get professional help from the community (Mental Health, Substance Abuse, psychological counseling)  · Do not be alone:Call your Safe Contact- someone whom you trust who will be there for you.  · Call your local CRISIS HOTLINE 127-9599 or 633-750-7911  · Call your local Children's Mobile Crisis Response Team [place] (955) 693-2756 or www.[employer]  · Call the toll free National Suicide Prevention Hotlines   · National Suicide Prevention Lifeline 863-113-UVVU (7699)  · National Hope Line Network 800-SUICIDE (636-5666)

## 2018-01-12 NOTE — H&P
The patient is 42 years of age, being admitted for vaginal hysterectomy.    ADMISSION DIAGNOSIS:  Symptomatic fibroid uterus with chronic progressive   menorrhagia and pelvic pain.    HISTORY OF PRESENT ILLNESS:  For the past 3 years this lady has been   complaining of increasingly heavier periods with more dysmenorrhea, pelvic   pressure, and now worsening deep dyspareunia, frequency and nocturia because   of the enlarged uterus pushing on the bladder.  Options have been discussed   for 3 to 4 years, and she more recently presented to me 6 months ago   requesting definitive surgery and now finally is being admitted for the   surgical procedure as described.  Previously, she was enjoying good   gynecologic health without any major serious systemic disease.  She entered   menarche at age 15, periods used to last 3-4 days, now lasting 3-7 days.  She   has good bowel control and good bladder control.  She reports normal Pap   smears.  No family history of gynecological malignancy.   has had a   vasectomy in the form of permanent birth control.  She had 2 pregnancies at   term both delivered vaginally and by history and recollection uneventful.    ALLERGIES:  She has no drug allergies.    SOCIAL HISTORY:  She is a nonsmoker.    MEDICATIONS:  She takes no prescription medications on a chronic basis.    PAST SURGICAL HISTORY:  She never had any major surgical procedure.    REVIEW OF SYSTEMS:  All systems reviewed and noncontributory, specifically no   cardiorespiratory, GI, endocrine, CNS or hematological problems.    PHYSICAL EXAMINATION:  GENERAL:  Most recent exam revealed a healthy-looking female.  VITAL SIGNS:  She weighed 110 pounds.  She is 5 feet 2, 124/74, pulse 70 and   regular, sinus rhythm.  SKIN:  Color was good.  NECK:  No goiter.  LUNGS:  Clear to auscultation.  HEART:  First and second heart sounds with no murmurs.  BREASTS:  Benign.  ABDOMEN:  No hepatosplenomegaly.  I cannot feel any pelvic  abdominal mass.  PELVIC:  She has central mobile uterus, enlarged, midline, irregular,   consistent with fibroids.  She has well supported uterus.  No obvious   significant pelvic relaxation.  EXTREMITIES:  Exam is normal.    ASSESSMENT AND PLAN:  Patient is fully counseled as to the nature of surgery,   how it is done, what I will do and how it is performed.  If possible, I will   also perform bilateral salpingectomy _____ make sure that she has an effective   bowel movement beforehand, and she presents to [facility] at least   2 hours before the scheduled time.       ____________________________________     MD BILL BEAL / ANDREW    DD:  01/11/2018 20:38:33  DT:  01/11/2018 21:40:57    D#:  [id number]  Job#:  [id number]

## 2018-01-12 NOTE — H&P
The patient is 42 years of age, being admitted for vaginal hysterectomy.    ADMISSION DIAGNOSIS:  Symptomatic fibroid uterus with chronic progressive   menorrhagia and pelvic pain.    HISTORY OF PRESENT ILLNESS:  For the past 3 years this lady has been   complaining of increasingly heavier periods with more dysmenorrhea, pelvic   pressure, and now worsening deep dyspareunia, frequency and nocturia because   of the enlarged uterus pushing on the bladder.  Options have been discussed   for 3 to 4 years, and she more recently presented to me 6 months ago   requesting definitive surgery and now finally is being admitted for the   surgical procedure as described.  Previously, she was enjoying good   gynecologic health without any major serious systemic disease.  She entered   menarche at age 15, periods used to last 3-4 days, now lasting 3-7 days.  She   has good bowel control and good bladder control.  She reports normal Pap   smears.  No family history of gynecological malignancy.   has had a   vasectomy in the form of permanent birth control.  She had 2 pregnancies at   term both delivered vaginally and by history and recollection uneventful.    ALLERGIES:  She has no drug allergies.    SOCIAL HISTORY:  She is a nonsmoker.    MEDICATIONS:  She takes no prescription medications on a chronic basis.    PAST SURGICAL HISTORY:  She never had any major surgical procedure.    REVIEW OF SYSTEMS:  All systems reviewed and noncontributory, specifically no   cardiorespiratory, GI, endocrine, CNS or hematological problems.    PHYSICAL EXAMINATION:  GENERAL:  Most recent exam revealed a healthy-looking female.  VITAL SIGNS:  She weighed 110 pounds.  She is 5 feet 2, 124/74, pulse 70 and   regular, sinus rhythm.  SKIN:  Color was good.  NECK:  No goiter.  LUNGS:  Clear to auscultation.  HEART:  First and second heart sounds with no murmurs.  BREASTS:  Benign.  ABDOMEN:  No hepatosplenomegaly.  I cannot feel any pelvic  abdominal mass.  PELVIC:  She has central mobile uterus, enlarged, midline, irregular,   consistent with fibroids.  She has well supported uterus.  No obvious   significant pelvic relaxation.  EXTREMITIES:  Exam is normal.    ASSESSMENT AND PLAN:  Patient is fully counseled as to the nature of surgery,   how it is done, what I will do and how it is performed.  If possible, I will   also perform bilateral salpingectomy _____ make sure that she has an effective   bowel movement beforehand, and she presents to [facility] at least   2 hours before the scheduled time.       ____________________________________     MD BILL BEAL / ANDREW    DD:  01/11/2018 20:38:33  DT:  01/11/2018 21:40:57    D#:  [id number]  Job#:  [id number]

## 2018-01-13 NOTE — OP REPORT
DATE OF SERVICE:  01/12/2018    PROCEDURES:  1.  Vaginal hysterectomy with enterocele repair.  2.  Diagnostic cystoscopy.    PREOPERATIVE DIAGNOSES:  Chronic progressive symptomatic fibroid uterus with   menorrhagia, dysmenorrhea, and pelvic pain.    POSTOPERATIVE DIAGNOSES:  Chronic progressive symptomatic fibroid uterus with   menorrhagia, dysmenorrhea, and pelvic pain.    SURGEON:  Alok Walker MD    ASSISTANT:  Sandro Soto MD    ANESTHESIA:  General endotracheal.    ANESTHESIOLOGIST:  Catracho Joseph MD    RECOGNIZED COMPLICATIONS:  None.    ESTIMATED BLOOD LOSS:  130 mL.    SPONGE AND INSTRUMENT COUNT:  Reported correct on 2 separate occasions.    TECHNIQUE:  The patient was brought to the operating room.  Once anesthesia   was secured, I placed her in a dorsal lithotomy position.  I positioned her   myself paying attention to positioning of the upper and lower extremities.    Exam under anesthesia revealed a central mobile enlarged uterus about 140-180   g, reasonably good support.  No adnexal pathology.  Patient's perineum,   vagina, and abdomen were then prepped and draped in normal sterile fashion.    She was given intravenous antibiotics preoperatively.  The cervix was grasped   and the cervical mucosa was injected with 1% lidocaine and epinephrine   mixture.  I placed a circumferential incision through the cervical mucosa and   the cervical stroma, and then used a combination of sharp and blunt dissection   to enter the posterior cul-de-sac and placed a long weighted speculum.  I   dissected the bladder anteriorly with sharp dissection and when the bladder   was comfortably away from the operative site, I clamped and sutured the   uterosacral and cardinal ligaments and tied them together and held them for   identification.  I then further dissected the bladder anteriorly, which   allowed me to clamp, cut, and suture the uterine vessels and the broad   ligament at that level.  At this time, I  could identify the anterior   cul-de-sac ___, I grasped it, incised it and mobilized it and placed Martha   retractor and could see omentum.  I then progressively clamped, cut, and   sutured the broad ligaments to the point that the uterus ___ descend   adequately.  I then morcellated and removed the cervix and a good part of the   lower uterine segment and large prominent fibroids.  This collapsed the   sidewalls allowing me to easily identify first the right then the left   utero-ovarian ligament.  They were clamped, free tied, stick tied, and held   for identification.  I then took a time-out for about 50 seconds and evaluated   the pelvic sidewalls.  There was complete hemostasis between the uterosacral   and uteroovarian ligaments.  I then attempted to perform salpingectomy, which   I discussed preoperatively with the patient.  I was unable to bring her left   fallopian tube down into the operative field                  adhesions at   that level                 years before she had laparotomy for urological   issues.  I suspect this is the cause of her lack of descent.  I then sutured   the vaginal mucosa to the uterosacral ligaments, first on the left then the   right side, employing multiple bites of peritoneum just above the rectum to   reduce enterocele formation.  I then performed a posterior culdoplasty   incorporating vaginal mucosa, uterosacral ligament from left to right side.    Just before tying this down, I again further inspected the sidewalls, and   there remained  complete hemostasis.  I cut the sutures to the adnexa and   allowed the normal-appearing ovaries to retract from the operative field.  I   then tied down the Delcid's culdoplasty sutures with excellent support in the   midline.  There was quite a bit of redundant tissue in the midline.  I excised   this and closed the vagina in a vertical manner.               total   hemostasis.  I had given indigo carmine.  The ureters functioned  immediately.    The bladder was inspected.  There was no pathology, no trauma.  I drained the   bladder.  She was awoken, extubated and transferred to recovery room.       ____________________________________     MD BILL BEAL / NTS    DD:  01/13/2018 08:29:37  DT:  01/13/2018 09:34:09    D#:  [id number]  Job#:  [id number]    cc: Gregorio Powers MD

## 2018-06-14 ENCOUNTER — HOSPITAL ENCOUNTER (OUTPATIENT)
Dept: RADIOLOGY | Facility: MEDICAL CENTER | Age: 43
End: 2018-06-14
Attending: OBSTETRICS & GYNECOLOGY
Payer: COMMERCIAL

## 2018-06-14 ENCOUNTER — OFFICE VISIT (OUTPATIENT)
Dept: MEDICAL GROUP | Facility: MEDICAL CENTER | Age: 43
End: 2018-06-14
Payer: COMMERCIAL

## 2018-06-14 VITALS
WEIGHT: 114.2 LBS | TEMPERATURE: 99.8 F | OXYGEN SATURATION: 99 % | HEIGHT: 62 IN | BODY MASS INDEX: 21.02 KG/M2 | DIASTOLIC BLOOD PRESSURE: 64 MMHG | SYSTOLIC BLOOD PRESSURE: 98 MMHG | HEART RATE: 90 BPM

## 2018-06-14 DIAGNOSIS — Z12.31 SCREENING MAMMOGRAM, ENCOUNTER FOR: ICD-10-CM

## 2018-06-14 DIAGNOSIS — Z00.00 ANNUAL PHYSICAL EXAM: ICD-10-CM

## 2018-06-14 DIAGNOSIS — Z00.00 HEALTH CARE MAINTENANCE: ICD-10-CM

## 2018-06-14 DIAGNOSIS — K42.9 UMBILICAL HERNIA WITHOUT OBSTRUCTION AND WITHOUT GANGRENE: ICD-10-CM

## 2018-06-14 PROCEDURE — 77067 SCR MAMMO BI INCL CAD: CPT

## 2018-06-14 PROCEDURE — 99396 PREV VISIT EST AGE 40-64: CPT | Performed by: INTERNAL MEDICINE

## 2018-06-14 ASSESSMENT — PATIENT HEALTH QUESTIONNAIRE - PHQ9: CLINICAL INTERPRETATION OF PHQ2 SCORE: 0

## 2018-06-14 NOTE — PROGRESS NOTES
CHIEF COMPLIANT:  Ayse Reyes is a 43 y.o. female who presents for annual exam    Pt has GYN provider: yes  Self breast exam: advised  PAP: 2014, normal    - st post hysterectomy  Last Mammography: 2018  Self breast exam: advised     Last labs: Prior to next office visit  Last Td: advised  Influenza vaccination: advised     Regular exercise: advised exercise at least 4 days  Diet: advised balanced diet  Dental exam: advised to have 1-2 times a year  Sunscreen use: advised  Seatbelt: yes     Umbilical hernia  No pain, redness, redness.     CURRENT MEDICATIONS  Current Outpatient Prescriptions   Medication Sig Dispense Refill   • cetirizine (ZYRTEC) 10 MG Tab Take 10 mg by mouth as needed for Allergies.     • acetaminophen (TYLENOL) 325 MG Tab Take 650 mg by mouth every four hours as needed.     • ascorbic acid (ASCORBIC ACID) 500 MG Tab Take 500 mg by mouth every day.     • St Johns Wort 1000 MG Cap Take  by mouth.       No current facility-administered medications for this visit.      ALLERGIES  Allergies: Latex; Peanuts [peanut oil]; and Shellfish allergy  PAST MEDICAL HISTORY  She  has a past medical history of Bowel habit changes; Fibroids; and IBS (irritable bowel syndrome).  SURGICAL HISTORY  She  has a past surgical history that includes breast implant revision; other orthopedic surgery (2008); vaginal hysterectomy total (N/A, 1/12/2018); and salpingectomy (Bilateral, 1/12/2018).  SOCIAL HISTORY  Social History   Substance Use Topics   • Smoking status: Never Smoker   • Smokeless tobacco: Never Used   • Alcohol use 0.0 oz/week      Comment: 3 q week     Social History     Social History Narrative   • No narrative on file     FAMILY HISTORY  Family History   Problem Relation Age of Onset   • Cancer Father      skin   • Diabetes Paternal Uncle    • Heart Attack Maternal Grandfather    • Heart Disease Maternal Grandfather    • Stroke Paternal Grandfather    • Alzheimer's Disease Paternal Grandfather    •  "Cancer Paternal Grandfather    • Hyperlipidemia Paternal Uncle    • Hypertension Neg Hx    • Thyroid Neg Hx      Family Status   Relation Status   • Mother Alive   • Father Alive   • Paternal Uncle    • Maternal Grandfather    • Paternal Grandfather    • Paternal Uncle    • Neg Hx      REVIEW OF SYSTEMS  Constitutional: Denies fever, chills, or sweats  Skin: negative for rash, scaling, itching, pigmentation, hair or nail changes.  Eyes: negative for visual blurring, double vision, eye pain, floaters and discharge from eyes  ENT: negative for tinnitus, vertigo, bleeding gums, dental problem and hoarseness, frequent URI's, sinus trouble, persistent sore throat  Respiratory: negative for persistent cough, hemoptysis, dyspnea, recurrent pneumonia or bronchitis, asthma and wheezing  Cardiovascular: negative for palpitations, tachycardia, irregular heart beat, chest pain, or peripheral edema.  Gastrointestinal: negative for poor appetite, dysphagia, nausea, heartburn or reflux, abdominal pain, hemorrhoids, constipation or diarrhea  Genitourinary: negative for dysuria, frequency, hesitancy, incontinence, sexually transmitted disease, abnormal vaginal discharge/bleeding, dysparunia   Musculoskeletal: negative for joint swelling and muscle pain/ soreness  Neuro: negative for migraine headaches, involuntary movements or tremor  Psychiatric: negative for excessive alcohol consumption or illegal drug use, sleep problems, anxiety, depression, sexual difficulties  Hematologic/Lymphatic/Immunologic: negative for anemia, unusual bruising, swollen glands, HIV risk factors  Endocrine: negative for temperature intolerance, polydipsia, polyuria.     PHYSICAL EXAMINATION:  Blood pressure (!) 98/64, pulse 90, temperature 37.7 °C (99.8 °F), height 1.575 m (5' 2\"), weight 51.8 kg (114 lb 3.2 oz), SpO2 99 %.  Body mass index is 20.89 kg/m².  Wt Readings from Last 4 Encounters:   06/14/18 51.8 kg (114 lb 3.2 oz)   01/12/18 52 kg (114 lb 10.2 " oz)   06/29/17 52.6 kg (116 lb)   05/17/17 53 kg (116 lb 13.5 oz)       General appearance:healthy, well developed, well nourished, well-groomed  Psych: alert, no distress, cooperative. Eye contact good, speech goal directed. Affect calm.   Eyes: conjunctivae and sclerae normal, lids and lashes normal, EOMI, PERRLA  ENT: Ears: external ears normal to inspection, canals clear. TMs pearly gray with normal light reflex and anatomic landmarks, Nose/Sinuses: nose shows no deformity, asymmetry, or inflammation, nasal mucosa normal, no sinus tenderness,   Oropharynx: lips normal without lesions, buccal mucosa normal, gums healthy, teeth intact, non-carious, palate normal, tongue midline and normal  Neck: no asymmetry, masses, adenopathy. Thyroid normal to palpation, carotids without bruits, no jugular venous distention  Lungs: clear to auscultation with good excursion, Normal respiratory rate. Chest symmetric no chest wall tenderness.  Cardiovascular: Regular rate and rhythm, no murmur.  No peripheral edema  Abdomen:  Soft, non-tender, no masses, HSM or palpable renal abnormalities. Bowel sounds normal, no bruits heard. No CVAT.  Small umbilical hernia.  Musculoskeletal: no evidence of joint effusion, ROM of all joints is normal, no crepitation detected, strength grossly normal UE and LE, proximal and distal motor groups. No deformities present  Lymphatic: None significantly enlarged supraclavicular, axillary, or inguinal  Skin: color normal, vascularity normal, no rashes or suspicious lesions, no evidence of bleeding or bruising  Neuro: speech normal, mental status intact, gait grossly normal, muscle tone normal.    LABS    Reviewed and discussed:    ASSESSMENT/PLAN    1. Annual physical exam  Reviewed PMH, PSH, FH, SH, ALL, MEDS, HCM/IMM.   Advised healthy habits, diet, exercise.    2. Health care maintenance  UTD    3. Umbilical hernia without obstruction and without gangrene  Small hernia; advised to go to the ER if  redness, swelling, pain.  Declined surgery referral.     Smoking Counseling: Nonsmoker    Next office visit is due in  1 year    All questions are answered.     Please note that this dictation was created using voice recognition software. Despite all efforts, some minor grammar mistakes are possible.

## 2020-03-13 ENCOUNTER — HOSPITAL ENCOUNTER (EMERGENCY)
Facility: MEDICAL CENTER | Age: 45
End: 2020-03-13
Attending: EMERGENCY MEDICINE
Payer: COMMERCIAL

## 2020-03-13 ENCOUNTER — APPOINTMENT (OUTPATIENT)
Dept: RADIOLOGY | Facility: MEDICAL CENTER | Age: 45
End: 2020-03-13
Attending: EMERGENCY MEDICINE
Payer: COMMERCIAL

## 2020-03-13 ENCOUNTER — APPOINTMENT (OUTPATIENT)
Dept: RADIOLOGY | Facility: MEDICAL CENTER | Age: 45
End: 2020-03-13
Payer: COMMERCIAL

## 2020-03-13 VITALS
BODY MASS INDEX: 21.5 KG/M2 | HEART RATE: 77 BPM | WEIGHT: 116.84 LBS | SYSTOLIC BLOOD PRESSURE: 105 MMHG | DIASTOLIC BLOOD PRESSURE: 70 MMHG | HEIGHT: 62 IN | TEMPERATURE: 97.6 F | RESPIRATION RATE: 16 BRPM | OXYGEN SATURATION: 100 %

## 2020-03-13 DIAGNOSIS — M54.9 PAIN, UPPER BACK: ICD-10-CM

## 2020-03-13 DIAGNOSIS — R07.9 CHEST PAIN, UNSPECIFIED TYPE: ICD-10-CM

## 2020-03-13 LAB
ALBUMIN SERPL BCP-MCNC: 5.2 G/DL (ref 3.2–4.9)
ALBUMIN/GLOB SERPL: 1.4 G/DL
ALP SERPL-CCNC: 48 U/L (ref 30–99)
ALT SERPL-CCNC: 15 U/L (ref 2–50)
ANION GAP SERPL CALC-SCNC: 14 MMOL/L (ref 7–16)
AST SERPL-CCNC: 19 U/L (ref 12–45)
BASOPHILS # BLD AUTO: 0.9 % (ref 0–1.8)
BASOPHILS # BLD: 0.08 K/UL (ref 0–0.12)
BILIRUB SERPL-MCNC: 1.1 MG/DL (ref 0.1–1.5)
BUN SERPL-MCNC: 13 MG/DL (ref 8–22)
CALCIUM SERPL-MCNC: 10.5 MG/DL (ref 8.5–10.5)
CHLORIDE SERPL-SCNC: 105 MMOL/L (ref 96–112)
CO2 SERPL-SCNC: 19 MMOL/L (ref 20–33)
CREAT SERPL-MCNC: 0.92 MG/DL (ref 0.5–1.4)
EKG IMPRESSION: NORMAL
EOSINOPHIL # BLD AUTO: 0.11 K/UL (ref 0–0.51)
EOSINOPHIL NFR BLD: 1.3 % (ref 0–6.9)
ERYTHROCYTE [DISTWIDTH] IN BLOOD BY AUTOMATED COUNT: 39.4 FL (ref 35.9–50)
GLOBULIN SER CALC-MCNC: 3.6 G/DL (ref 1.9–3.5)
GLUCOSE SERPL-MCNC: 100 MG/DL (ref 65–99)
HCT VFR BLD AUTO: 42.9 % (ref 37–47)
HGB BLD-MCNC: 14.8 G/DL (ref 12–16)
IMM GRANULOCYTES # BLD AUTO: 0.02 K/UL (ref 0–0.11)
IMM GRANULOCYTES NFR BLD AUTO: 0.2 % (ref 0–0.9)
LYMPHOCYTES # BLD AUTO: 1.09 K/UL (ref 1–4.8)
LYMPHOCYTES NFR BLD: 12.5 % (ref 22–41)
MCH RBC QN AUTO: 31.9 PG (ref 27–33)
MCHC RBC AUTO-ENTMCNC: 34.5 G/DL (ref 33.6–35)
MCV RBC AUTO: 92.5 FL (ref 81.4–97.8)
MONOCYTES # BLD AUTO: 0.35 K/UL (ref 0–0.85)
MONOCYTES NFR BLD AUTO: 4 % (ref 0–13.4)
NEUTROPHILS # BLD AUTO: 7.08 K/UL (ref 2–7.15)
NEUTROPHILS NFR BLD: 81.1 % (ref 44–72)
NRBC # BLD AUTO: 0 K/UL
NRBC BLD-RTO: 0 /100 WBC
PLATELET # BLD AUTO: 224 K/UL (ref 164–446)
PMV BLD AUTO: 9.6 FL (ref 9–12.9)
POTASSIUM SERPL-SCNC: 3.9 MMOL/L (ref 3.6–5.5)
PROT SERPL-MCNC: 8.8 G/DL (ref 6–8.2)
RBC # BLD AUTO: 4.64 M/UL (ref 4.2–5.4)
SODIUM SERPL-SCNC: 138 MMOL/L (ref 135–145)
TROPONIN T SERPL-MCNC: <6 NG/L (ref 6–19)
WBC # BLD AUTO: 8.7 K/UL (ref 4.8–10.8)

## 2020-03-13 PROCEDURE — 700117 HCHG RX CONTRAST REV CODE 255: Performed by: EMERGENCY MEDICINE

## 2020-03-13 PROCEDURE — 36415 COLL VENOUS BLD VENIPUNCTURE: CPT

## 2020-03-13 PROCEDURE — 80053 COMPREHEN METABOLIC PANEL: CPT

## 2020-03-13 PROCEDURE — 84484 ASSAY OF TROPONIN QUANT: CPT

## 2020-03-13 PROCEDURE — 93005 ELECTROCARDIOGRAM TRACING: CPT | Performed by: EMERGENCY MEDICINE

## 2020-03-13 PROCEDURE — 74175 CTA ABDOMEN W/CONTRAST: CPT

## 2020-03-13 PROCEDURE — 85025 COMPLETE CBC W/AUTO DIFF WBC: CPT

## 2020-03-13 PROCEDURE — 93005 ELECTROCARDIOGRAM TRACING: CPT

## 2020-03-13 PROCEDURE — 99284 EMERGENCY DEPT VISIT MOD MDM: CPT

## 2020-03-13 RX ORDER — IBUPROFEN 200 MG
200-400 TABLET ORAL EVERY 6 HOURS PRN
COMMUNITY

## 2020-03-13 RX ADMIN — IOHEXOL 80 ML: 350 INJECTION, SOLUTION INTRAVENOUS at 11:55

## 2020-03-13 ASSESSMENT — LIFESTYLE VARIABLES: DO YOU DRINK ALCOHOL: YES

## 2020-03-13 NOTE — ED NOTES
Pharmacy Medication Reconciliation      Medication reconciliation updated and complete per pt at bedside  Allergies have been verified and updated   No oral ABX within the last 14 days  Pt home pharmacy:[facility]

## 2020-03-13 NOTE — ED PROVIDER NOTES
"ED Provider Note    Scribed for López Thayer D.O. by María Rosen. 3/13/2020  11:00 AM    Primary care provider: Gregorio Powers M.D.  Means of arrival: Walk-in  History obtained from: Patient  History limited by: None    CHIEF COMPLAINT  Chief Complaint   Patient presents with   • Chest Pain     \"for a while\" on and off, in upper back and pain in left arm, nausea last night   • Back Pain     upper back pain       HPI  Ayse Reyes is a 44 y.o. female who presents to the Emergency Department for evaluation of intermittent chest pain onset \"months\" ago. Patient describes the pain as dull and states that it radiates up to her throat and down her left arm. She states that her pain comes and goes every day. Additionally, she reports of upper back pain that radiates to her shoulders that started a few days ago and neck pain. Patient has taken ibuprofen for pain management with mild alleviation. She states that she has been nauseous with abdominal discomfort since last night. She has no past medical history of acid reflux, diabetes, or blood clots. Patient has not seen a cardiologist in the past 10 years. She has a past surgical history of a hysterectomy.     Cardiac Risk Factors:  No Age > 65  No Aspirin use within 7 days  No prior history of coronary artery disease  No diabetes  No hyperlipidemia   No hypertension  No obesity  No family history of coronary artery disease at a young age <54 yo  No tobacco use   No drugs (methamphetamine or cocaine)  No history of aortic aneurysm   No history of aortic dissection   No history of deep vein thrombosis or pulmonary embolism   No hormone replacement  No oral birth control    REVIEW OF SYSTEMS  Pertinent positives include chest pain, shoulder pain, back pain, abdominal discomfort, nausea. Pertinent negatives include no fever.  All other systems reviewed and negative.    PAST MEDICAL HISTORY  Past Medical History:   Diagnosis Date   • Bowel habit changes  " "   constipation/diarrhea   • Fibroids    • IBS (irritable bowel syndrome)      SURGICAL HISTORY  Past Surgical History:   Procedure Laterality Date   • VAGINAL HYSTERECTOMY TOTAL N/A 1/12/2018    Procedure: VAGINAL HYSTERECTOMY TOTAL;  Surgeon: Alok Walker M.D.;  Location: SURGERY SAME DAY [facility];  Service: Gynecology   • SALPINGECTOMY Bilateral 1/12/2018    Procedure: SALPINGECTOMY;  Surgeon: Alok Walker M.D.;  Location: SURGERY SAME DAY [facility];  Service: Gynecology   • OTHER ORTHOPEDIC SURGERY  2008    bunionectomy   • BREAST IMPLANT REVISION      augmentation 4 years ago,      SOCIAL HISTORY  Social History     Tobacco Use   • Smoking status: Never Smoker   • Smokeless tobacco: Never Used   Substance Use Topics   • Alcohol use: Yes     Alcohol/week: 0.0 oz     Comment: 3 q week   • Drug use: No      Social History     Substance and Sexual Activity   Drug Use No       FAMILY HISTORY  Family History   Problem Relation Age of Onset   • Cancer Father         skin   • Diabetes Paternal Uncle    • Heart Attack Maternal Grandfather    • Heart Disease Maternal Grandfather    • Stroke Paternal Grandfather    • Alzheimer's Disease Paternal Grandfather    • Cancer Paternal Grandfather    • Hyperlipidemia Paternal Uncle    • Hypertension Neg Hx    • Thyroid Neg Hx        CURRENT MEDICATIONS  Home Medications     Reviewed by Franca Clark (Pharmacy Tech) on 03/13/20 at 1209  Med List Status: Complete   Medication Last Dose Status   cetirizine (ZYRTEC) 10 MG Tab 3/12/2020 Active   ibuprofen (MOTRIN) 200 MG Tab 3/13/2020 Active                ALLERGIES  Allergies   Allergen Reactions   • Peanuts [Peanut Oil] Anaphylaxis     Mouth gets itchy, swelling and shortness of breath     • Shellfish Allergy Anaphylaxis   • Latex Rash and Itching     Itching & Rash         PHYSICAL EXAM  VITAL SIGNS: /86   Pulse 74   Temp 36.4 °C (97.6 °F) (Temporal)   Resp 16   Ht 1.575 m (5' 2\")   Wt 53 kg (116 " lb 13.5 oz)   [facility] 12/22/2017   SpO2 99%   BMI 21.37 kg/m²     Nursing notes and vitals reviewed.  Constitutional: Well developed, Well nourished, No acute distress, Non-toxic appearance.   Eyes: PERRLA, EOMI, Conjunctiva normal, No discharge.   Cardiovascular: Normal heart rate, Normal rhythm, No murmurs, No rubs, No gallops.   Thorax & Lungs: No respiratory distress, No rales, No rhonchi, No wheezing, No chest tenderness.   Abdomen: Bowel sounds normal, Soft, No tenderness, No guarding, No rebound, No masses, No pulsatile masses.   Skin: Warm, Dry, No erythema, No rash.   Musculoskeletal: Intact distal pulses, No edema, No cyanosis, No clubbing. Good range of motion in all major joints. No tenderness to palpation or major deformities noted, no CVA tenderness, no midline back tenderness.   Neurologic: Alert & oriented x 3, Normal motor function, Normal sensory function, No focal deficits noted.  Psychiatric: Affect normal for clinical presentation.    DIAGNOSTIC STUDIES/PROCEDURES    LABS  Results for orders placed or performed during the hospital encounter of 03/13/20   CBC with Differential   Result Value Ref Range    WBC 8.7 4.8 - 10.8 K/uL    RBC 4.64 4.20 - 5.40 M/uL    Hemoglobin 14.8 12.0 - 16.0 g/dL    Hematocrit 42.9 37.0 - 47.0 %    MCV 92.5 81.4 - 97.8 fL    MCH 31.9 27.0 - 33.0 pg    MCHC 34.5 33.6 - 35.0 g/dL    RDW 39.4 35.9 - 50.0 fL    Platelet Count 224 164 - 446 K/uL    MPV 9.6 9.0 - 12.9 fL    Neutrophils-Polys 81.10 (H) 44.00 - 72.00 %    Lymphocytes 12.50 (L) 22.00 - 41.00 %    Monocytes 4.00 0.00 - 13.40 %    Eosinophils 1.30 0.00 - 6.90 %    Basophils 0.90 0.00 - 1.80 %    Immature Granulocytes 0.20 0.00 - 0.90 %    Nucleated RBC 0.00 /100 WBC    Neutrophils (Absolute) 7.08 2.00 - 7.15 K/uL    Lymphs (Absolute) 1.09 1.00 - 4.80 K/uL    Monos (Absolute) 0.35 0.00 - 0.85 K/uL    Eos (Absolute) 0.11 0.00 - 0.51 K/uL    Baso (Absolute) 0.08 0.00 - 0.12 K/uL    Immature Granulocytes (abs) 0.02  0.00 - 0.11 K/uL    NRBC (Absolute) 0.00 K/uL   Complete Metabolic Panel (CMP)   Result Value Ref Range    Sodium 138 135 - 145 mmol/L    Potassium 3.9 3.6 - 5.5 mmol/L    Chloride 105 96 - 112 mmol/L    Co2 19 (L) 20 - 33 mmol/L    Anion Gap 14.0 7.0 - 16.0    Glucose 100 (H) 65 - 99 mg/dL    Bun 13 8 - 22 mg/dL    Creatinine 0.92 0.50 - 1.40 mg/dL    Calcium 10.5 8.5 - 10.5 mg/dL    AST(SGOT) 19 12 - 45 U/L    ALT(SGPT) 15 2 - 50 U/L    Alkaline Phosphatase 48 30 - 99 U/L    Total Bilirubin 1.1 0.1 - 1.5 mg/dL    Albumin 5.2 (H) 3.2 - 4.9 g/dL    Total Protein 8.8 (H) 6.0 - 8.2 g/dL    Globulin 3.6 (H) 1.9 - 3.5 g/dL    A-G Ratio 1.4 g/dL   Troponin   Result Value Ref Range    Troponin T <6 6 - 19 ng/L   ESTIMATED GFR   Result Value Ref Range    GFR If African American >60 >60 mL/min/1.73 m 2    GFR If Non African American >60 >60 mL/min/1.73 m 2   EKG   Result Value Ref Range    Report       [facility] Emergency Dept.    Test Date:  2020  Pt Name:    JASKARAN RUIZ                  Department: ER  MRN:        [id number]                      Room:  Gender:     Female                       Technician: [id number]  :        1975                   Requested By:ER TRIAGE PROTOCOL  Order #:    [id number]                    Reading MD: LOYD VILLAFUERTE DO    Measurements  Intervals                                Axis  Rate:       92                           P:          79  ID:         [id number]                          QRS:        -35  QRSD:       86                           T:          12  QT:         388  QTc:        481    Interpretive Statements  SINUS RHYTHM  LEFT AXIS DEVIATION  BORDERLINE R WAVE PROGRESSION, ANTERIOR LEADS  MINIMAL ST DEPRESSION, DIFFUSE LEADS  No previous ECG available for comparison  Electronically Signed On 3- 11:28:57 PDT by LOYD VILLAFUERTE DO       All labs reviewed by me.    RADIOLOGY  CT-CTA COMPLETE THORACOABDOMINAL AORTA   Final Result      No evidence  of acute traumatic aortic injury              The radiologist's interpretation of all radiological studies have been reviewed by me.    COURSE & MEDICAL DECISION MAKING  Pertinent Labs & Imaging studies reviewed. (See chart for details)    11:00 AM - Patient seen and examined at bedside. Ordered CBC with diff, CMP, troponin, EKG, CT-CTA thoracoabdominal aorta to evaluate her symptoms.     This is a charming 44 y.o. female that presents with chest pain and back pain.  The patient has a slightly abnormal EKG with ST depression diffusely but no evidence of ST elevation myocardial infarction.  Troponin is negative her symptoms are greater than 1 month.  Is concern for possible aortic dissection or aortic aneurysm therefore CT thoracic abdominal aorta was evaluated.  CTA was negative for acute abnormality such as aortic dissection, aortic aneurysm or significant event aortic abnormality.  The patient did have slight changes in her EKG with a very atypical story.  I have arranged for her to have an outpatient evaluation the cardiology on the 27th of this month and strict return precautions have been given.  I do believe she probably has myofascial strain causing her symptoms.  She understands the need to return to the emergency department.  She is not hypoxic, tachypneic, tachycardic and she has a PERC criteria of 0 therefore PE was not investigated.      FINAL IMPRESSION  1. Chest pain, unspecified type    2. Pain, upper back       DISPOSITION:  Patient will be discharged home in stable condition.    FOLLOW UP:  [facility], Emergency Dept  [address] [id number] [phone]    If symptoms worsen    Gregorio Powers M.D.  [address]  [address]  [facility] [id number] [phone]         María MANJARREZ (Scrteena), walt scribing for, and in the presence of, López Thayer D.O    Electronically signed by: María Awad), 3/13/2020    López MANJARREZ D.O.  personally performed the services described in this documentation, as scribed by María Rosen in my presence, and it is both accurate and complete.C.    The note accurately reflects work and decisions made by me.  López Thayer D.O.  3/13/2020  1:41 PM

## 2020-03-13 NOTE — DISCHARGE INSTRUCTIONS
You will have an appointment with cardiology on March 27 at 4 PM.  Please access [employer] for further clarification of timing and location.  Please return to the emergency department if increasing symptoms of severe chest pain, lightness, dizziness, difficulty breathing.

## 2020-03-13 NOTE — ED NOTES
Discharge paperwork reviewed with patient. No questions at this time. Patient ambulated with a steady gate out of ER with .

## 2020-03-13 NOTE — ED NOTES
Patient ambulated steadily to room. Changed into gown and connected to monitor. Chart up for ERP.

## 2020-03-13 NOTE — ED TRIAGE NOTES
"Pt ambulated to triage with   Chief Complaint   Patient presents with   • Chest Pain     \"for a while\" on and off, in upper back and pain in left arm, nausea last night   • Back Pain     upper back pain     EKG completed.  Pt Informed regarding triage process and verbalized understanding to inform triage tech or RN for any changes in condition. Placed in lobby.       "

## 2020-03-26 ENCOUNTER — TELEPHONE (OUTPATIENT)
Dept: CARDIOLOGY | Facility: MEDICAL CENTER | Age: 45
End: 2020-03-26

## 2020-03-26 NOTE — TELEPHONE ENCOUNTER
Spoke w/ pt. Regarding upcoming appt. W/ RS. Prior cardio approx. 12yrs ago, she does not remember the provider, but thinks it may have been a [place] office.

## 2020-03-27 ENCOUNTER — OFFICE VISIT (OUTPATIENT)
Dept: CARDIOLOGY | Facility: MEDICAL CENTER | Age: 45
End: 2020-03-27
Payer: COMMERCIAL

## 2020-03-27 VITALS
BODY MASS INDEX: 22.26 KG/M2 | HEIGHT: 62 IN | DIASTOLIC BLOOD PRESSURE: 68 MMHG | WEIGHT: 121 LBS | OXYGEN SATURATION: 99 % | HEART RATE: 102 BPM | SYSTOLIC BLOOD PRESSURE: 102 MMHG

## 2020-03-27 DIAGNOSIS — R07.89 ATYPICAL CHEST PAIN: ICD-10-CM

## 2020-03-27 DIAGNOSIS — R94.31 NONSPECIFIC ABNORMAL ELECTROCARDIOGRAM (ECG) (EKG): ICD-10-CM

## 2020-03-27 LAB — EKG IMPRESSION: NORMAL

## 2020-03-27 PROCEDURE — 99242 OFF/OP CONSLTJ NEW/EST SF 20: CPT | Performed by: INTERNAL MEDICINE

## 2020-03-27 PROCEDURE — 93000 ELECTROCARDIOGRAM COMPLETE: CPT | Performed by: INTERNAL MEDICINE

## 2020-03-27 ASSESSMENT — FIBROSIS 4 INDEX: FIB4 SCORE: 0.96

## 2020-03-27 ASSESSMENT — ENCOUNTER SYMPTOMS
RESPIRATORY NEGATIVE: 1
NEUROLOGICAL NEGATIVE: 1
MUSCULOSKELETAL NEGATIVE: 1
GASTROINTESTINAL NEGATIVE: 1
CONSTITUTIONAL NEGATIVE: 1

## 2020-03-27 NOTE — PROGRESS NOTES
"Chief Complaint   Patient presents with   • Chest Pain   • Abnormal EKG       Subjective:   Ayse Reyes is a 45 y.o. female who presents today at request of Dr. Thayer from the emergency room for evaluation of chest pain.  The patient was seen in the ER on March 13, for evaluation of atypical chest pain.  EKG at that time was abnormal with QT prolongation and nonspecific ST-T changes.  The patient describes her pain as a very brief \"wave\" of discomfort in her left upper chest.  This is not [employer] brought on by activity.  The discomfort lasts only seconds.  There is no radiation of pain to the neck or jaw or arms.  She had a CT angios of her chest during that emergency room visit because of radiation of the pain to the back.  This showed no abnormality.  The cardiac silhouette was normal on the CT and there is no pericardial effusion.    Her cardiac risk factor profile is negative for smoking, hypertension, diabetes or known lipid problems.  Family history is notable for the fact that her father had a stent at age 72 and mother is in good health.    Social history: , non-smoker, exercises moderately doing high intensity training at home    Past Medical History:   Diagnosis Date   • Bowel habit changes     constipation/diarrhea   • Fibroids    • IBS (irritable bowel syndrome)      Past Surgical History:   Procedure Laterality Date   • VAGINAL HYSTERECTOMY TOTAL N/A 1/12/2018    Procedure: VAGINAL HYSTERECTOMY TOTAL;  Surgeon: Alko Walker M.D.;  Location: SURGERY SAME DAY [facility];  Service: Gynecology   • SALPINGECTOMY Bilateral 1/12/2018    Procedure: SALPINGECTOMY;  Surgeon: Alok Walker M.D.;  Location: SURGERY SAME DAY [facility];  Service: Gynecology   • OTHER ORTHOPEDIC SURGERY  2008    bunionectomy   • BREAST IMPLANT REVISION      augmentation 4 years ago,     Family History   Problem Relation Age of Onset   • Cancer Father         skin   • Diabetes Paternal Uncle    • Heart " Attack Maternal Grandfather    • Heart Disease Maternal Grandfather    • Stroke Paternal Grandfather    • Alzheimer's Disease Paternal Grandfather    • Cancer Paternal Grandfather    • Hyperlipidemia Paternal Uncle    • Hypertension Neg Hx    • Thyroid Neg Hx      Social History     Socioeconomic History   • Marital status:      Spouse name: Not on file   • Number of children: Not on file   • Years of education: Not on file   • Highest education level: Not on file   Occupational History   • Not on file   Social Needs   • Financial resource strain: Not on file   • Food insecurity     Worry: Not on file     Inability: Not on file   • Transportation needs     Medical: Not on file     Non-medical: Not on file   Tobacco Use   • Smoking status: Never Smoker   • Smokeless tobacco: Never Used   Substance and Sexual Activity   • Alcohol use: Yes     Alcohol/week: 0.0 oz     Comment: 3 q week   • Drug use: No   • Sexual activity: Not on file   Lifestyle   • Physical activity     Days per week: Not on file     Minutes per session: Not on file   • Stress: Not on file   Relationships   • Social connections     Talks on phone: Not on file     Gets together: Not on file     Attends [employer] service: Not on file     Active member of club or organization: Not on file     Attends meetings of clubs or organizations: Not on file     Relationship status: Not on file   • Intimate partner violence     Fear of current or ex partner: Not on file     Emotionally abused: Not on file     Physically abused: Not on file     Forced sexual activity: Not on file   Other Topics Concern   • Not on file   Social History Narrative   • Not on file     Allergies   Allergen Reactions   • Peanuts [Peanut Oil] Anaphylaxis     Mouth gets itchy, swelling and shortness of breath     • Shellfish Allergy Anaphylaxis   • Latex Rash and Itching     Itching & Rash       Outpatient Encounter Medications as of 3/27/2020   Medication Sig Dispense Refill   •  "ibuprofen (MOTRIN) 200 MG Tab Take 200-400 mg by mouth every 6 hours as needed for Mild Pain or Headache.     • cetirizine (ZYRTEC) 10 MG Tab Take 10 mg by mouth every evening.       No facility-administered encounter medications on file as of 3/27/2020.      Review of Systems   Constitutional: Negative.    HENT: Negative.    Respiratory: Negative.    Cardiovascular: Positive for chest pain.   Gastrointestinal: Negative.    Musculoskeletal: Negative.    Skin: Negative.    Neurological: Negative.    Endo/Heme/Allergies: Negative.         Objective:   /68 (BP Location: Left arm, Patient Position: Sitting, BP Cuff Size: Adult)   Pulse (!) 102   Ht 1.575 m (5' 2\")   Wt 54.9 kg (121 lb)   LMP 12/22/2017   SpO2 99%   BMI 22.13 kg/m²     Physical Exam   Constitutional: She is oriented to person, place, and time. No distress.   HENT:   Head: Normocephalic and atraumatic.   Eyes: Pupils are equal, round, and reactive to light. No scleral icterus.   Neck: No JVD present.   Cardiovascular: Normal rate and regular rhythm.   No murmur heard.  Pulmonary/Chest: No respiratory distress. She has no wheezes.   Chest wall pain to palpation over the 2nd-3rd left costochondral junction   Abdominal: Soft. She exhibits no distension. There is no abdominal tenderness.   Musculoskeletal:         General: No edema.   Neurological: She is alert and oriented to person, place, and time.   Skin: Skin is warm.   Psychiatric: She has a normal mood and affect.       Assessment:     1. Nonspecific abnormal electrocardiogram (ECG) (EKG)  EKG   2. Atypical chest pain  EKG       Medical Decision Making:  Today's Assessment / Status / Plan:   Chest pain: The pain is atypical for coronary disease and that it was brief and localized.  The pain was also nonexertional.  On exam she has definite chest wall pain to palpation over the left second or third costochondral junctions suggesting that she has costochondritis.    Abnormal EKG: The EKG from " March 13 was personally reviewed.  This demonstrates nonspecific anterior ST-T changes and borderline QT prolongation.  Repeat EKG today: My interpretation is sinus rhythm with slow R wave progression unchanged and the ST segment changes have totally resolved.  The EKG is normal.    The etiology of her nonspecific anterior ST segment changes on the previous EKG is uncertain.  Electrolytes were not abnormal at the time of the EKG evaluation.  She was not doing any fasting diets etc.  It is very unlikely to be ischemic.  Today's EKG is entirely normal.    Because of the very atypical nature of her chest pain and normal resting EKG it is unlikely that a treadmill would be of any benefit.  She will return as needed.

## 2020-03-30 ENCOUNTER — APPOINTMENT (OUTPATIENT)
Dept: MEDICAL GROUP | Age: 45
End: 2020-03-30
Payer: COMMERCIAL

## 2021-04-07 ENCOUNTER — APPOINTMENT (OUTPATIENT)
Dept: RADIOLOGY | Facility: MEDICAL CENTER | Age: 46
End: 2021-04-07
Attending: INTERNAL MEDICINE

## (undated) DEVICE — KIT ANESTHESIA W/CIRCUIT & 3/LT BAG W/FILTER (20EA/CA)

## (undated) DEVICE — MASK ANESTHESIA ADULT  - (100/CA)

## (undated) DEVICE — GLOVE BIOGEL PI INDICATOR SZ 6.5 SURGICAL PF LF - (50/BX 4BX/CA)

## (undated) DEVICE — KIT  I.V. START (100EA/CA)

## (undated) DEVICE — NEPTUNE 4 PORT MANIFOLD - (20/PK)

## (undated) DEVICE — DRAPE VAGINAL BIB W/ POUCH (10EA/CA)

## (undated) DEVICE — SOLUTION D-5-W INJ. 1000 ML (14EA/CA)

## (undated) DEVICE — SET IRRIGATION CYSTOSCOPY TUBE L80 IN (20EA/CA)

## (undated) DEVICE — SET LEADWIRE 5 LEAD BEDSIDE DISPOSABLE ECG (1SET OF 5/EA)

## (undated) DEVICE — BLADE SURGICAL CLIPPER - (50EA/CA)

## (undated) DEVICE — MANIFOLD NEPTUNE 1 PORT (20/PK)

## (undated) DEVICE — CANISTER SUCTION RIGID RED 1500CC (40EA/CA)

## (undated) DEVICE — GLOVE SZ 6.5 BIOGEL PI MICRO - PF LF (50PR/BX)

## (undated) DEVICE — GLOVE BIOGEL SZ 7.5 SURGICAL PF LTX - (50PR/BX 4BX/CA)

## (undated) DEVICE — PAD BABY LAP 4X18 W/O - RINGS PREWASHED 5/PK 40PK/CS

## (undated) DEVICE — GOWN SURGEONS X-LARGE - DISP. (30/CA)

## (undated) DEVICE — SUTURE 0 COATED VICRYL 6-18IN - (12PK/BX)

## (undated) DEVICE — ELECTRODE 850 FOAM ADHESIVE - HYDROGEL RADIOTRNSPRNT (50/PK)

## (undated) DEVICE — CATHETER IV 20 GA X 1-1/4 ---SURG.& SDS ONLY--- (50EA/BX)

## (undated) DEVICE — TUBE CONNECTING SUCTION - CLEAR PLASTIC STERILE 72 IN (50EA/CA)

## (undated) DEVICE — WATER IRRIGATION STERILE 1000ML (12EA/CA)

## (undated) DEVICE — TUBING CLEARLINK DUO-VENT - C-FLO (48EA/CA)

## (undated) DEVICE — GLOVE SZ 7.5 BIOGEL PI MICRO - PF LF (50PR/BX)

## (undated) DEVICE — SUTURE 0 VICRYL PLUS CT-2 - 27 INCH (36/BX)

## (undated) DEVICE — SUCTION INSTRUMENT YANKAUER BULBOUS TIP W/O VENT (50EA/CA)

## (undated) DEVICE — SODIUM CHL IRRIGATION 0.9% 1000ML (12EA/CA)

## (undated) DEVICE — GOWN WARMING STANDARD FLEX - (30/CA)

## (undated) DEVICE — GOWN SURGEONS LARGE - (32/CA)

## (undated) DEVICE — PAD SANITARY 11IN MAXI IND WRAPPED  (12EA/PK 24PK/CA)

## (undated) DEVICE — TRAY SRGPRP PVP IOD WT PRP - (20/CA)

## (undated) DEVICE — GLOVE BIOGEL SZ 7 SURGICAL PF LTX - (50PR/BX 4BX/CA)

## (undated) DEVICE — HEAD HOLDER JUNIOR/ADULT

## (undated) DEVICE — SENSOR SPO2 NEO LNCS ADHESIVE (20/BX) SEE USER NOTES

## (undated) DEVICE — SUTURE GENERAL

## (undated) DEVICE — GLOVE BIOGEL PI INDICATOR SZ 7.5 SURGICAL PF LF -(50/BX 4BX/CA)

## (undated) DEVICE — TUBE E-T HI-LO CUFF 7.5MM (10EA/PK)

## (undated) DEVICE — Device

## (undated) DEVICE — DRAPE LAP PELVISCOPY - (10/CA)

## (undated) DEVICE — ELECTRODE DUAL RETURN W/ CORD - (50/PK)

## (undated) DEVICE — CANISTER SUCTION 3000ML MECHANICAL FILTER AUTO SHUTOFF MEDI-VAC NONSTERILE LF DISP  (40EA/CA)

## (undated) DEVICE — PROTECTOR ULNA NERVE - (36PR/CA)

## (undated) DEVICE — SLEEVE, VASO, THIGH, MED